# Patient Record
Sex: MALE | Race: WHITE | NOT HISPANIC OR LATINO | URBAN - METROPOLITAN AREA
[De-identification: names, ages, dates, MRNs, and addresses within clinical notes are randomized per-mention and may not be internally consistent; named-entity substitution may affect disease eponyms.]

---

## 2018-10-08 NOTE — PATIENT DISCUSSION
POAG OU:  VISUAL FIELD SHOWS SUPERIOR ARCUATE AND INFERIOR ARCUATE CHANGES OU, WILL DISCUSS STARTING DROPS VS SLT.

## 2018-10-12 NOTE — PATIENT DISCUSSION
POAG OU:  VISUAL FIELD SHOWS LOTS OF PROGRESSION. DISCUSSED OPTIONS OF STARTING DROPS VS SLT. DISCUSSED RBA'S OF BOTH. PATIENT WISHES TO PROCEED WITH SLT OU. WILL SCHEDULE SLT OD THEN OS.

## 2018-10-15 NOTE — PATIENT DISCUSSION
WILL GET RECORDS FROM [facility] - PT WILL CALL WITH A PHONE NUMBER - WILL COMPARE RECORDS AND SEE IF INJECTIONS WILL BENEFIT PATIENT.

## 2018-11-15 NOTE — PATIENT DISCUSSION
New Prescription: prednisolone acetate (prednisolone acetate): drops,suspension: 1% 1 drop four times a day into right eye 11-

## 2018-11-26 NOTE — PATIENT DISCUSSION
Continue: prednisolone acetate (prednisolone acetate): drops,suspension: 1% 1 drop four times a day into right eye 11-

## 2018-12-28 NOTE — PATIENT DISCUSSION
POAG, OU- S/P SLT OU: INTRAOCULAR PRESSURE IS WITHIN ACCEPTABLE LIMITS. PATIENT OFF DROPS CONTROLLED BY SLT. PATIENT INSTRUCTED TO RETURN FOR FOLLOW-UP AS SCHEDULED.

## 2019-01-11 ENCOUNTER — IMPORTED ENCOUNTER (OUTPATIENT)
Dept: URBAN - METROPOLITAN AREA CLINIC 50 | Facility: CLINIC | Age: 79
End: 2019-01-11

## 2019-02-14 ENCOUNTER — IMPORTED ENCOUNTER (OUTPATIENT)
Dept: URBAN - METROPOLITAN AREA CLINIC 50 | Facility: CLINIC | Age: 79
End: 2019-02-14

## 2019-02-14 NOTE — PATIENT DISCUSSION
"""Monitor ERM for changes. Informed patient of potential for worsening.  Instructed patient to call ""

## 2019-03-14 ENCOUNTER — IMPORTED ENCOUNTER (OUTPATIENT)
Dept: URBAN - METROPOLITAN AREA CLINIC 50 | Facility: CLINIC | Age: 79
End: 2019-03-14

## 2019-05-08 NOTE — PATIENT DISCUSSION
PROLIFERATIVE DIABETIC RETINOPATHY, OU: NEW VITREOUS HEMORRHAGE OD. RETURN FOR FOLLOW-UP WITH DR Gallo Narvaez TO DISCUSS FILL-IN PRP LASER OD.

## 2019-05-08 NOTE — PATIENT DISCUSSION
DIABETIC MACULAR EDEMA AND VMT, OU: INCREASED DME OU; DECREASED VISION OS. ALL  TX OPTIONS DISCUSSED, INCLUDING OBSERVATION AND PPV SURGERY. PATIENT ELECTED TRIAL OF ANTI-VEGF THERAPY TODAY OU. RETURN TO DR Rosalie Caicedo AS SCHEDULED.

## 2019-07-12 NOTE — PATIENT DISCUSSION
POAG, OU: INTRAOCULAR PRESSURE IS ELEVATED. PT INSTRUCTED TO CONTINUE LUMIGAN QHS OU. PT HAS NOT BEEN COMPLIANT AND ADVISED PT TO RESTART DROPS. RETURN FOR FOLLOW-UP AS SCHEDULED.

## 2019-09-17 NOTE — PATIENT DISCUSSION
POAG OU:  VISUAL FIELD SHOWS INCREASE PROGRESSION OF VISION LOSS IN BOTH EYES SHOWING SUPERIOR/INFERIOR NASAL STEP DEFECT OD AND SUPERIOR/INFERIOR ARCUATE CHANGE OS. WILL DISCUSS DROPS VS SLT.

## 2019-10-11 NOTE — PATIENT DISCUSSION
PROLIFERATIVE DIABETIC RETINOPATHY OU: NO TREATMENT IS NEEDED TODAY.  WILL CONTINUE TO FOLLOW WITH DILATED EXAMS AS SCHEDULED WITH DR Del Castillo Shown wound(s)

## 2019-10-11 NOTE — PATIENT DISCUSSION
POAG, OU: INTRAOCULAR PRESSURE IS WITHIN ACCEPTABLE LIMITS. PT INSTRUCTED TO CONTINUE LUMIGAN AND RETURN FOR FOLLOW-UP AS SCHEDULED. PT HAS TROUBLE WITH COMPLIANCE. DISCUSSED OPTION OF DOING SLT ANNUALLY.  PT ONLY WANTS TO CONTINUE WITH DROP QHS

## 2021-04-18 ASSESSMENT — PACHYMETRY
OD_CT_UM: 506
OS_CT_UM: 520

## 2021-04-18 ASSESSMENT — VISUAL ACUITY
OS_CC: 20/40-
OS_CC: J1+
OS_OTHER: 20/50. 20/70.
OS_BAT: 20/50
OD_CC: 20/40-
OD_BAT: 20/50
OD_CC: J1+
OS_PH: 20/40-+
OD_PH: 20/30-
OD_OTHER: 20/50. 20/60.

## 2021-04-18 ASSESSMENT — TONOMETRY
OD_IOP_MMHG: 16
OS_IOP_MMHG: 16

## 2021-11-09 NOTE — PATIENT DISCUSSION
PATIENT LAST HAD SLT 3 YEARS AGO. PATIENT HAD SO MUCH VISUAL FIELD LOSS THIS TIME THAT WILL NEED TO ADD COSOPT OU BID AS WELL AS SCHEDULE SLT OU.

## 2022-03-04 NOTE — PROCEDURE NOTE: CLINICAL
PROCEDURE NOTE: SLT OD. Anesthesia: Topical. Prep: Betadine Flush. Prior to treatment, the risks/benefits/alternatives were discussed. The patient wished to proceed with procedure. Lens:  SLT laser lens with goniosol. Power: 1mJ. Total applications: 664. Application 742 Degrees. Patient tolerated procedure well. There were no complications. Post-op instructions given. Jesusita Canada PROCEDURE NOTE: SLT OD. Diagnosis: POAG, Moderate. Anesthesia: Topical. Prep: Betadine Flush. Prior to treatment, the risks/benefits/alternatives were discussed. The patient wished to proceed with procedure. Lens:  SLT laser lens with goniosol. Power: *mJ. Total applications: *. Application 412 Degrees. Patient tolerated procedure well. There were no complications. Post-op instructions given. Post-op IOP = * mmHg. Jesusita Canada

## 2022-03-31 ENCOUNTER — NEW PATIENT (OUTPATIENT)
Dept: URBAN - METROPOLITAN AREA CLINIC 48 | Facility: CLINIC | Age: 82
End: 2022-03-31

## 2022-03-31 DIAGNOSIS — H35.373: ICD-10-CM

## 2022-03-31 DIAGNOSIS — H25.13: ICD-10-CM

## 2022-03-31 DIAGNOSIS — H52.13: ICD-10-CM

## 2022-03-31 PROCEDURE — 92015 DETERMINE REFRACTIVE STATE: CPT

## 2022-03-31 PROCEDURE — 92004 COMPRE OPH EXAM NEW PT 1/>: CPT

## 2022-03-31 PROCEDURE — 92134 CPTRZ OPH DX IMG PST SGM RTA: CPT

## 2022-03-31 ASSESSMENT — VISUAL ACUITY
OU_CC: J1+
OU_CC: 20/50
OS_GLARE: 20/100
OS_CC: J1
OD_GLARE: 20/80
OD_CC: 20/60
OS_PH: 20/30
OD_GLARE: 20/70
OD_PH: 20/30
OD_CC: J1
OS_GLARE: 20/100
OS_CC: 20/60

## 2022-03-31 ASSESSMENT — TONOMETRY
OD_IOP_MMHG: 17
OS_IOP_MMHG: 17

## 2022-03-31 NOTE — PATIENT DISCUSSION
Per increased c/d ratio, OU. IOP 17/17. (-)Family hx of Glaucoma. OCT RNFL/HVF (03/14/2019) wnl OD/OS. Will have patient repeat after CE IOL.

## 2022-04-08 NOTE — PROCEDURE NOTE: CLINICAL
PROCEDURE NOTE: SLT OS. Diagnosis: POAG, Moderate. Anesthesia: Topical. Prep: Betadine Flush. Prior to treatment, the risks/benefits/alternatives were discussed. The patient wished to proceed with procedure. Lens:  SLT laser lens with goniosol. Power: 1mJ. Total applications: 376. Application 626 Degrees. Patient tolerated procedure well. There were no complications. Post-op instructions given. Tennille Cole

## 2022-04-12 ENCOUNTER — DIAGNOSTICS ONLY (OUTPATIENT)
Dept: URBAN - METROPOLITAN AREA CLINIC 49 | Facility: CLINIC | Age: 82
End: 2022-04-12

## 2022-04-12 DIAGNOSIS — H25.13: ICD-10-CM

## 2022-04-12 PROCEDURE — 92136 OPHTHALMIC BIOMETRY: CPT

## 2022-04-12 PROCEDURE — 92025IOL CORNEAL TOPOGRAPHY PREMIUM IOL

## 2022-04-12 ASSESSMENT — KERATOMETRY
OS_AXISANGLE2_DEGREES: 2
OS_AXISANGLE_DEGREES: 092
OD_AXISANGLE2_DEGREES: 167
OD_AXISANGLE_DEGREES: 077
OS_K1POWER_DIOPTERS: 45.25
OD_K1POWER_DIOPTERS: 44.25
OD_K2POWER_DIOPTERS: 43.37
OS_K2POWER_DIOPTERS: 44.00

## 2022-05-06 ENCOUNTER — PRE-OP/H&P (OUTPATIENT)
Dept: URBAN - METROPOLITAN AREA CLINIC 49 | Facility: CLINIC | Age: 82
End: 2022-05-06

## 2022-05-06 DIAGNOSIS — H35.373: ICD-10-CM

## 2022-05-06 DIAGNOSIS — H25.12: ICD-10-CM

## 2022-05-06 PROCEDURE — PREOP PRE OP VISIT

## 2022-05-06 PROCEDURE — 92134 CPTRZ OPH DX IMG PST SGM RTA: CPT

## 2022-05-06 ASSESSMENT — KERATOMETRY
OS_K2POWER_DIOPTERS: 44.00
OS_AXISANGLE2_DEGREES: 2
OS_AXISANGLE_DEGREES: 092
OD_AXISANGLE2_DEGREES: 167
OS_K1POWER_DIOPTERS: 45.25
OD_K1POWER_DIOPTERS: 44.25
OD_K2POWER_DIOPTERS: 43.37
OD_AXISANGLE_DEGREES: 077

## 2022-05-06 ASSESSMENT — VISUAL ACUITY
OS_SC: 20/40
OD_PH: 20/30+2
OU_SC: 20/30-1/+2
OS_PH: 20/30-2
OD_SC: 20/30-2

## 2022-05-06 ASSESSMENT — TONOMETRY
OD_IOP_MMHG: 15
OS_IOP_MMHG: 16

## 2022-05-06 NOTE — PATIENT DISCUSSION
CATARACT SURGERY PLANNER - STANDARD IOL/NO FEMTO: Phacoemulsification with IOL: Eye: left|DOS: 5/11/22|Model: [id number]|Power: 19. 5|Target: dist|Visc: duet|Omidria: yes|10% Phenylephrine: no|Epi-shugarcaine: yes|Phaco Setting: dense|BSS+: no|Trypan Blue: no|CTR: no|Olive Tip: no|Atropine: no|Pupilloplasty: no|Notes: ERM.

## 2022-05-11 ENCOUNTER — SURGERY/PROCEDURE (OUTPATIENT)
Dept: URBAN - METROPOLITAN AREA SURGERY 16 | Facility: SURGERY | Age: 82
End: 2022-05-11

## 2022-05-11 DIAGNOSIS — H25.12: ICD-10-CM

## 2022-05-11 PROBLEM — Z96.1: Noted: 2022-05-11

## 2022-05-11 PROCEDURE — 66984 XCAPSL CTRC RMVL W/O ECP: CPT

## 2022-05-11 ASSESSMENT — KERATOMETRY
OS_AXISANGLE_DEGREES: 092
OS_K1POWER_DIOPTERS: 45.25
OS_AXISANGLE2_DEGREES: 2
OD_K2POWER_DIOPTERS: 43.37
OD_AXISANGLE_DEGREES: 077
OS_K2POWER_DIOPTERS: 44.00
OD_K1POWER_DIOPTERS: 44.25
OD_AXISANGLE2_DEGREES: 167

## 2022-05-12 ENCOUNTER — POST-OP (OUTPATIENT)
Dept: URBAN - METROPOLITAN AREA CLINIC 48 | Facility: CLINIC | Age: 82
End: 2022-05-12

## 2022-05-12 DIAGNOSIS — Z96.1: ICD-10-CM

## 2022-05-12 DIAGNOSIS — Z98.42: ICD-10-CM

## 2022-05-12 PROCEDURE — 99024 POSTOP FOLLOW-UP VISIT: CPT

## 2022-05-12 ASSESSMENT — KERATOMETRY
OD_K2POWER_DIOPTERS: 43.37
OS_K1POWER_DIOPTERS: 45.25
OD_AXISANGLE2_DEGREES: 167
OD_K1POWER_DIOPTERS: 44.25
OS_AXISANGLE2_DEGREES: 2
OS_AXISANGLE_DEGREES: 092
OD_AXISANGLE_DEGREES: 077
OS_K2POWER_DIOPTERS: 44.00

## 2022-05-12 ASSESSMENT — VISUAL ACUITY: OS_SC: 20/50-1

## 2022-05-12 ASSESSMENT — TONOMETRY: OS_IOP_MMHG: 15

## 2022-05-18 ENCOUNTER — POST OP/EVAL OF SECOND EYE (OUTPATIENT)
Dept: URBAN - METROPOLITAN AREA CLINIC 48 | Facility: CLINIC | Age: 82
End: 2022-05-18

## 2022-05-18 DIAGNOSIS — Z96.1: ICD-10-CM

## 2022-05-18 DIAGNOSIS — H25.11: ICD-10-CM

## 2022-05-18 DIAGNOSIS — Z98.42: ICD-10-CM

## 2022-05-18 PROCEDURE — PREOP PRE OP VISIT

## 2022-05-18 PROCEDURE — 92136 - 2N OPHTHALMIC BIOMETRY BY PARTIAL COHERENCE INTERFEROMETRY WITH INTRAOCULAR LENS POWER CALCULATION

## 2022-05-18 ASSESSMENT — KERATOMETRY
OS_K2POWER_DIOPTERS: 44.00
OS_AXISANGLE2_DEGREES: 2
OS_K1POWER_DIOPTERS: 45.25
OD_AXISANGLE_DEGREES: 077
OD_K2POWER_DIOPTERS: 43.37
OD_K1POWER_DIOPTERS: 44.25
OD_AXISANGLE2_DEGREES: 167
OS_AXISANGLE_DEGREES: 092

## 2022-05-18 ASSESSMENT — VISUAL ACUITY
OS_SC: 20/25-2
OS_SC: J2-1
OD_SC: 20/40-2

## 2022-05-18 ASSESSMENT — TONOMETRY
OD_IOP_MMHG: 16
OS_IOP_MMHG: 16

## 2022-05-18 NOTE — PATIENT DISCUSSION
CATARACT SURGERY PLANNER - STANDARD IOL/NO FEMTO: Phacoemulsification with IOL: Eye: RIGHT|DOS: 5/25/22|Model: [id number]|Power: 20. 5|Target: PLANO|Visc: DUET|Omidria: YES|10% Phenylephrine: NO|Epi-shugarcaine: YES|Phaco Setting: DENSE|BSS+: NO|Trypan Blue: NO|CTR: NO|Olive Tip: NO|Atropine: NO|Pupilloplasty: NO|Notes: ERM.

## 2022-05-25 ENCOUNTER — SURGERY/PROCEDURE (OUTPATIENT)
Dept: URBAN - METROPOLITAN AREA SURGERY 16 | Facility: SURGERY | Age: 82
End: 2022-05-25

## 2022-05-25 DIAGNOSIS — H25.11: ICD-10-CM

## 2022-05-25 PROBLEM — Z96.1: Noted: 2022-05-11

## 2022-05-25 PROBLEM — Z96.1: Noted: 2022-05-25

## 2022-05-25 PROCEDURE — 66984 XCAPSL CTRC RMVL W/O ECP: CPT

## 2022-05-25 ASSESSMENT — KERATOMETRY
OD_K2POWER_DIOPTERS: 43.37
OS_K2POWER_DIOPTERS: 44.00
OS_AXISANGLE2_DEGREES: 2
OS_K1POWER_DIOPTERS: 45.25
OD_AXISANGLE_DEGREES: 077
OD_K1POWER_DIOPTERS: 44.25
OS_AXISANGLE_DEGREES: 092
OD_AXISANGLE2_DEGREES: 167

## 2022-05-26 ENCOUNTER — POST-OP (OUTPATIENT)
Dept: URBAN - METROPOLITAN AREA CLINIC 48 | Facility: CLINIC | Age: 82
End: 2022-05-26

## 2022-05-26 DIAGNOSIS — Z98.41: ICD-10-CM

## 2022-05-26 PROCEDURE — 99024 POSTOP FOLLOW-UP VISIT: CPT

## 2022-05-26 ASSESSMENT — KERATOMETRY
OD_AXISANGLE2_DEGREES: 167
OS_AXISANGLE2_DEGREES: 2
OD_K2POWER_DIOPTERS: 43.37
OS_K2POWER_DIOPTERS: 44.00
OS_AXISANGLE_DEGREES: 092
OS_K1POWER_DIOPTERS: 45.25
OD_AXISANGLE_DEGREES: 077
OD_K1POWER_DIOPTERS: 44.25

## 2022-05-26 ASSESSMENT — VISUAL ACUITY: OD_SC: 20/40

## 2022-05-26 ASSESSMENT — TONOMETRY: OD_IOP_MMHG: 12

## 2022-05-27 ENCOUNTER — FOLLOW UP (OUTPATIENT)
Dept: URBAN - METROPOLITAN AREA CLINIC 49 | Facility: CLINIC | Age: 82
End: 2022-05-27

## 2022-05-27 DIAGNOSIS — Z96.1: ICD-10-CM

## 2022-05-27 PROCEDURE — 99024 POSTOP FOLLOW-UP VISIT: CPT

## 2022-05-27 ASSESSMENT — KERATOMETRY
OS_AXISANGLE_DEGREES: 092
OD_AXISANGLE2_DEGREES: 167
OD_K1POWER_DIOPTERS: 44.25
OS_K2POWER_DIOPTERS: 44.00
OS_K1POWER_DIOPTERS: 45.25
OD_K2POWER_DIOPTERS: 43.37
OS_AXISANGLE2_DEGREES: 2
OD_AXISANGLE_DEGREES: 077

## 2022-05-27 ASSESSMENT — VISUAL ACUITY
OS_SC: 20/25-2
OD_SC: 20/40

## 2022-05-27 ASSESSMENT — TONOMETRY
OS_IOP_MMHG: 16
OD_IOP_MMHG: 16

## 2022-05-31 ENCOUNTER — POST-OP (OUTPATIENT)
Dept: URBAN - METROPOLITAN AREA CLINIC 48 | Facility: CLINIC | Age: 82
End: 2022-05-31

## 2022-05-31 DIAGNOSIS — Z96.1: ICD-10-CM

## 2022-05-31 DIAGNOSIS — Z98.41: ICD-10-CM

## 2022-05-31 PROCEDURE — 99024 POSTOP FOLLOW-UP VISIT: CPT

## 2022-05-31 ASSESSMENT — TONOMETRY
OD_IOP_MMHG: 15
OS_IOP_MMHG: 15

## 2022-05-31 ASSESSMENT — VISUAL ACUITY
OS_SC: 20/25+2
OD_SC: 20/25-2 PUSH
OD_SC: J4

## 2022-05-31 ASSESSMENT — KERATOMETRY
OS_AXISANGLE_DEGREES: 092
OS_K2POWER_DIOPTERS: 44.00
OS_AXISANGLE2_DEGREES: 2
OD_K2POWER_DIOPTERS: 43.37
OD_AXISANGLE_DEGREES: 077
OD_K1POWER_DIOPTERS: 44.25
OD_AXISANGLE2_DEGREES: 167
OS_K1POWER_DIOPTERS: 45.25

## 2022-06-27 NOTE — PATIENT DISCUSSION
COAG OU- S/P SLT. NEEDS SLT YEARLY. IOP CREEPING HIGHER AFTER SLT. HAS LOST A LOT OF VISION ON VF OU AND ALSO HAS ENLARGED NERVES. START LUMIGAN OU QHS TODAY.
General:
Medications:
New Prescription: Lumigan (bimatoprost): drops: 0.01% 1 drop at bedtime into both eyes 03-
show

## 2022-07-22 ENCOUNTER — POST-OP (OUTPATIENT)
Dept: URBAN - METROPOLITAN AREA CLINIC 49 | Facility: CLINIC | Age: 82
End: 2022-07-22

## 2022-07-22 DIAGNOSIS — Z98.42: ICD-10-CM

## 2022-07-22 DIAGNOSIS — H35.373: ICD-10-CM

## 2022-07-22 DIAGNOSIS — Z98.41: ICD-10-CM

## 2022-07-22 DIAGNOSIS — H52.13: ICD-10-CM

## 2022-07-22 PROCEDURE — 92015 DETERMINE REFRACTIVE STATE: CPT

## 2022-07-22 PROCEDURE — 99024 POSTOP FOLLOW-UP VISIT: CPT

## 2022-07-22 PROCEDURE — 92134 CPTRZ OPH DX IMG PST SGM RTA: CPT

## 2022-07-22 ASSESSMENT — VISUAL ACUITY
OD_GLARE: 20/30
OD_GLARE: 20/50
OS_GLARE: 20/70
OU_SC: J4
OD_SC: 20/25
OS_SC: 20/25
OS_GLARE: 20/40

## 2022-07-22 ASSESSMENT — KERATOMETRY
OS_AXISANGLE_DEGREES: 092
OD_K2POWER_DIOPTERS: 43.37
OS_K1POWER_DIOPTERS: 45.25
OS_K2POWER_DIOPTERS: 44.00
OD_AXISANGLE2_DEGREES: 167
OD_K1POWER_DIOPTERS: 44.25
OD_AXISANGLE_DEGREES: 077
OS_AXISANGLE2_DEGREES: 2

## 2022-07-22 ASSESSMENT — TONOMETRY
OD_IOP_MMHG: 13
OS_IOP_MMHG: 13

## 2022-10-13 ENCOUNTER — FOLLOW UP (OUTPATIENT)
Dept: URBAN - METROPOLITAN AREA CLINIC 52 | Facility: CLINIC | Age: 82
End: 2022-10-13

## 2022-10-13 DIAGNOSIS — H35.373: ICD-10-CM

## 2022-10-13 PROCEDURE — 92134 CPTRZ OPH DX IMG PST SGM RTA: CPT

## 2022-10-13 PROCEDURE — 92012 INTRM OPH EXAM EST PATIENT: CPT

## 2022-10-13 ASSESSMENT — KERATOMETRY
OS_K2POWER_DIOPTERS: 44.00
OS_AXISANGLE2_DEGREES: 2
OD_AXISANGLE2_DEGREES: 167
OS_AXISANGLE_DEGREES: 092
OD_K2POWER_DIOPTERS: 43.37
OD_AXISANGLE_DEGREES: 077
OS_K1POWER_DIOPTERS: 45.25
OD_K1POWER_DIOPTERS: 44.25

## 2022-10-13 ASSESSMENT — TONOMETRY
OD_IOP_MMHG: 13
OS_IOP_MMHG: 11

## 2022-10-13 ASSESSMENT — VISUAL ACUITY
OD_SC: 20/20-1
OS_SC: 20/25

## 2022-12-08 NOTE — PATIENT DISCUSSION
DISCUSSED VF PROGRESSION OU. PATIENT USING LATANOPROST OU QHS AND COSOPT OU BID. LAST DID SLT OU LAST MARCH/APRIL. [facility] [address] [facility] [place]. GAVE SAMPLE.  CONTINUE OTHER DROPS. CAN REPEAT SLT IN MARCH/APRIL.

## 2022-12-11 NOTE — PATIENT DISCUSSION
In depth discussion of the risk of permanent blindness if not compliant with diabetic treatment including medications, monitoring/controlling blood sugars and regular follow up exams. Name band;

## 2023-06-05 NOTE — PATIENT DISCUSSION
DIABETIC MACULAR EDEMA, OU: IMPROVING - NO TREATMENT TODAY. FOLLOW UP AS SCHEDULED.
General:
PROLIFERATIVE DIABETIC RETINOPATHY OU: NO TREATMENT IS NEEDED TODAY. WILL CONTINUE TO FOLLOW WITH DILATED EXAMS AS SCHEDULED.
4 = No assist / stand by assistance

## 2023-07-13 ENCOUNTER — COMPREHENSIVE EXAM (OUTPATIENT)
Dept: URBAN - METROPOLITAN AREA CLINIC 48 | Facility: LOCATION | Age: 83
End: 2023-07-13

## 2023-07-13 DIAGNOSIS — H40.013: ICD-10-CM

## 2023-07-13 DIAGNOSIS — H35.373: ICD-10-CM

## 2023-07-13 DIAGNOSIS — H26.493: ICD-10-CM

## 2023-07-13 PROCEDURE — 92014 COMPRE OPH EXAM EST PT 1/>: CPT

## 2023-07-13 PROCEDURE — 92134 CPTRZ OPH DX IMG PST SGM RTA: CPT

## 2023-07-13 ASSESSMENT — VISUAL ACUITY
OS_GLARE: 20/25
OS_SC: 20/20
OD_GLARE: 20/25
OD_GLARE: 20/30
OS_GLARE: 20/30
OU_SC: J2
OD_SC: 20/20

## 2023-07-13 ASSESSMENT — KERATOMETRY
OD_AXISANGLE_DEGREES: 175
OS_AXISANGLE2_DEGREES: 90
OS_K1POWER_DIOPTERS: 43.75
OS_AXISANGLE_DEGREES: 180
OD_K2POWER_DIOPTERS: 44.50
OD_AXISANGLE2_DEGREES: 85
OS_K2POWER_DIOPTERS: 43.75
OD_K1POWER_DIOPTERS: 43.50

## 2023-07-13 ASSESSMENT — TONOMETRY
OS_IOP_MMHG: 12
OD_IOP_MMHG: 13

## 2023-08-11 ENCOUNTER — DIAGNOSTICS ONLY (OUTPATIENT)
Dept: URBAN - METROPOLITAN AREA CLINIC 48 | Facility: LOCATION | Age: 83
End: 2023-08-11

## 2023-08-11 DIAGNOSIS — H40.013: ICD-10-CM

## 2023-08-11 PROCEDURE — 92133 CPTRZD OPH DX IMG PST SGM ON: CPT

## 2023-08-11 PROCEDURE — 92083 EXTENDED VISUAL FIELD XM: CPT

## 2023-08-11 ASSESSMENT — KERATOMETRY
OD_K2POWER_DIOPTERS: 44.50
OS_AXISANGLE_DEGREES: 180
OD_AXISANGLE_DEGREES: 175
OS_K2POWER_DIOPTERS: 43.75
OS_AXISANGLE2_DEGREES: 90
OD_AXISANGLE2_DEGREES: 85
OD_K1POWER_DIOPTERS: 43.50
OS_K1POWER_DIOPTERS: 43.75

## 2024-07-18 ENCOUNTER — COMPREHENSIVE EXAM (OUTPATIENT)
Dept: URBAN - METROPOLITAN AREA CLINIC 48 | Facility: LOCATION | Age: 84
End: 2024-07-18

## 2024-07-18 DIAGNOSIS — H40.013: ICD-10-CM

## 2024-07-18 DIAGNOSIS — H26.493: ICD-10-CM

## 2024-07-18 DIAGNOSIS — H35.373: ICD-10-CM

## 2024-07-18 PROCEDURE — 99214 OFFICE O/P EST MOD 30 MIN: CPT

## 2024-07-18 PROCEDURE — 92134 CPTRZ OPH DX IMG PST SGM RTA: CPT

## 2024-07-18 ASSESSMENT — KERATOMETRY
OD_K1POWER_DIOPTERS: 43.50
OD_AXISANGLE2_DEGREES: 85
OS_K1POWER_DIOPTERS: 43.75
OS_K2POWER_DIOPTERS: 43.75
OS_AXISANGLE_DEGREES: 180
OD_AXISANGLE_DEGREES: 175
OS_AXISANGLE2_DEGREES: 90
OD_K2POWER_DIOPTERS: 44.50

## 2024-07-18 ASSESSMENT — TONOMETRY
OD_IOP_MMHG: 9
OS_IOP_MMHG: 8

## 2024-07-18 ASSESSMENT — VISUAL ACUITY
OU_SC: 20/20
OS_GLARE: 20/60
OS_GLARE: 20/20-2
OS_SC: 20/20-1
OU_SC: J1+
OD_GLARE: 20/60
OD_GLARE: 20/25
OD_SC: 20/20

## 2025-08-08 ENCOUNTER — COMPREHENSIVE EXAM (OUTPATIENT)
Age: 85
End: 2025-08-08

## 2025-08-08 DIAGNOSIS — H35.373: ICD-10-CM

## 2025-08-08 DIAGNOSIS — H40.013: ICD-10-CM

## 2025-08-08 DIAGNOSIS — H26.493: ICD-10-CM

## 2025-08-08 PROCEDURE — 99214 OFFICE O/P EST MOD 30 MIN: CPT

## 2025-08-08 PROCEDURE — 92134 CPTRZ OPH DX IMG PST SGM RTA: CPT
